# Patient Record
Sex: MALE | Race: WHITE
[De-identification: names, ages, dates, MRNs, and addresses within clinical notes are randomized per-mention and may not be internally consistent; named-entity substitution may affect disease eponyms.]

---

## 2017-05-11 NOTE — CR
EXAMINATION: Left knee

 

HISTORY: Injury

 

COMPARISON: None

 

TECHNIQUE: 4 views

 

FINDINGS/IMPRESSION: There is no acute osseous abnormality, dislocation, or fracture. Bone mineraliz
ation and joint spaces appear normal. There is prominent prepatellar soft tissue thickening.

## 2017-09-28 ENCOUNTER — HOSPITAL ENCOUNTER (OUTPATIENT)
Dept: HOSPITAL 56 - MW.ED | Age: 37
Setting detail: OBSERVATION
LOS: 2 days | Discharge: HOME | End: 2017-09-30
Attending: INTERNAL MEDICINE | Admitting: INTERNAL MEDICINE
Payer: COMMERCIAL

## 2017-09-28 DIAGNOSIS — F17.210: ICD-10-CM

## 2017-09-28 DIAGNOSIS — R07.9: ICD-10-CM

## 2017-09-28 DIAGNOSIS — F32.9: ICD-10-CM

## 2017-09-28 DIAGNOSIS — G47.30: ICD-10-CM

## 2017-09-28 DIAGNOSIS — I48.91: Primary | ICD-10-CM

## 2017-09-28 DIAGNOSIS — Z98.890: ICD-10-CM

## 2017-09-28 DIAGNOSIS — F10.99: ICD-10-CM

## 2017-09-28 DIAGNOSIS — F41.9: ICD-10-CM

## 2017-09-28 DIAGNOSIS — K21.9: ICD-10-CM

## 2017-09-28 DIAGNOSIS — Z79.899: ICD-10-CM

## 2017-09-28 DIAGNOSIS — Z79.82: ICD-10-CM

## 2017-09-28 LAB
CHLORIDE SERPL-SCNC: 112 MMOL/L (ref 98–110)
SODIUM SERPL-SCNC: 140 MMOL/L (ref 136–146)

## 2017-09-28 PROCEDURE — 93306 TTE W/DOPPLER COMPLETE: CPT

## 2017-09-28 PROCEDURE — C9113 INJ PANTOPRAZOLE SODIUM, VIA: HCPCS

## 2017-09-28 PROCEDURE — 96376 TX/PRO/DX INJ SAME DRUG ADON: CPT

## 2017-09-28 PROCEDURE — G0378 HOSPITAL OBSERVATION PER HR: HCPCS

## 2017-09-28 PROCEDURE — 84439 ASSAY OF FREE THYROXINE: CPT

## 2017-09-28 PROCEDURE — 82553 CREATINE MB FRACTION: CPT

## 2017-09-28 PROCEDURE — 80305 DRUG TEST PRSMV DIR OPT OBS: CPT

## 2017-09-28 PROCEDURE — 96375 TX/PRO/DX INJ NEW DRUG ADDON: CPT

## 2017-09-28 PROCEDURE — 71275 CT ANGIOGRAPHY CHEST: CPT

## 2017-09-28 PROCEDURE — 84484 ASSAY OF TROPONIN QUANT: CPT

## 2017-09-28 PROCEDURE — 96361 HYDRATE IV INFUSION ADD-ON: CPT

## 2017-09-28 PROCEDURE — 80053 COMPREHEN METABOLIC PANEL: CPT

## 2017-09-28 PROCEDURE — 85610 PROTHROMBIN TIME: CPT

## 2017-09-28 PROCEDURE — 93005 ELECTROCARDIOGRAM TRACING: CPT

## 2017-09-28 PROCEDURE — 84443 ASSAY THYROID STIM HORMONE: CPT

## 2017-09-28 PROCEDURE — 96365 THER/PROPH/DIAG IV INF INIT: CPT

## 2017-09-28 PROCEDURE — 81001 URINALYSIS AUTO W/SCOPE: CPT

## 2017-09-28 PROCEDURE — 99285 EMERGENCY DEPT VISIT HI MDM: CPT

## 2017-09-28 PROCEDURE — 82550 ASSAY OF CK (CPK): CPT

## 2017-09-28 PROCEDURE — 71010: CPT

## 2017-09-28 PROCEDURE — 36415 COLL VENOUS BLD VENIPUNCTURE: CPT

## 2017-09-28 PROCEDURE — 83735 ASSAY OF MAGNESIUM: CPT

## 2017-09-28 PROCEDURE — 80048 BASIC METABOLIC PNL TOTAL CA: CPT

## 2017-09-28 PROCEDURE — 85025 COMPLETE CBC W/AUTO DIFF WBC: CPT

## 2017-09-28 NOTE — EDM.PDOC
ED HPI GENERAL MEDICAL PROBLEM





- General


Chief Complaint: Chest Pain


Stated Complaint: CHEST PAIN


Time Seen by Provider: 09/28/17 20:54


Source of Information: Reports: Patient





- History of Present Illness


INITIAL COMMENTS - FREE TEXT/NARRATIVE: 





HISTORY AND PHYSICAL:


History of present illness:


[]Patient presents with right-sided chest pain radiating into the right arm he 

rates 5 out of 10 there is a reproducible component no radiation to neck or jaw 

not associated with shortness of breath or diaphoresis





However his rate is in the 150s he received Cardizem 20 mg IV followed by 25 mg 

of Cardizem which did control rate along with digoxin 0.125 and 2 L bolus





Currently he is resting easily in the 80s for her rate cautery still does have 

the pain is a reproducible component to the pain there is clear muscle spasm 

over right pack major





No fever nausea vomiting chills sweats





Denies chronic illness disease alcoholism or illicit drug use








Review of systems: 


As per history of present illness and below otherwise all systems reviewed and 

negative.


Past medical history: 


As per history of present illness and as reviewed below otherwise 

noncontributory.


Surgical history: 


As per history of present illness and as reviewed below otherwise 

noncontributory.


Social history: 


No reported history of drug or alcohol abuse.


Family history: 


As per history of present illness and as reviewed below otherwise 

noncontributory.


Physical exam:


HEENT: Atraumatic, normocephalic, pupils reactive, negative for conjunctival 

pallor or scleral icterus, mucous membranes moist, throat clear, neck supple, 

nontender, trachea midline.








Lungs: Clear to auscultation, breath sounds equal bilaterally, chest nontender.


Heart: S1S2, regular, negative for clicks, rubs, or JVD.


Abdomen: Soft, nondistended, nontender. Negative for masses or 

hepatosplenomegaly. Negative for costovertebral tenderness.


Pelvis: Stable nontender.


Genitourinary: Deferred.


Rectal: Deferred.


Extremities: Atraumatic, negative for cords or calf pain. Neurovascular 

unremarkable.


Neuro: Awake, alert, oriented. Cranial nerves II through XII unremarkable. 

Cerebellum unremarkable. Motor and sensory unremarkable throughout. Exam 

nonfocal.








Diagnostics:


[]Lab as below


EKG


Chest 1 view








Therapeutics:


[]Liter normal saline bolus 2


Cardizem 20 mg IV, Cardizem 25 mg IV


Digoxin 0.125 g IV











Impression: 


[]A. fib with RVR


Definitive disposition and diagnosis as appropriate pending reevaluation and 

review of above.


  ** Right Anterior Chest


Pain Score (Numeric/FACES): 6





- Related Data


 Allergies











Allergy/AdvReac Type Severity Reaction Status Date / Time


 


No Known Allergies Allergy   Verified 09/28/17 20:44











Home Meds: 


 Home Meds





Citalopram [Citalopram Hbr] 30 mg PO DAILY 07/04/14 [History]


buPROPion HCl [Wellbutrin SR] 300 mg PO DAILY 07/04/14 [History]











Past Medical History





- Past Health History


Medical/Surgical History: Denies Medical/Surgical History


Cardiovascular History: Reports: None


Respiratory History: Reports: None


Gastrointestinal History: Reports: None


Genitourinary History: Reports: None


Musculoskeletal History: Reports: None


Neurological History: Reports: None


Psychiatric History: Reports: Anxiety, Depression


Endocrine/Metabolic History: Reports: None


Dermatologic History: Reports: None





- Infectious Disease History


Infectious Disease History: Reports: Chicken Pox





- Past Surgical History


HEENT Surgical History: Reports: Other (See Below)


Other HEENT Surgeries/Procedures: facial reconstruction


Cardiovascular Surgical History: Reports: None


Respiratory Surgical History: Reports: None


Musculoskeletal Surgical History: Reports: None





Social & Family History





- Family History


Family Medical History: Noncontributory





- Tobacco Use


Smoking Status *Q: Current Every Day Smoker


Years of Tobacco use: 20


Packs/Tins Daily: 1.5





- Alcohol Use


Days Per Week of Alcohol Use: 2


Number of Drinks Per Day: 2


Total Drinks Per Week: 4





- Recreational Drug Use


Recreational Drug Use: No





ED ROS GENERAL





- Review of Systems


Review Of Systems: ROS reveals no pertinent complaints other than HPI.





ED EXAM, GENERAL





- Physical Exam


Exam: See Below





Course





- Vital Signs


Last Recorded V/S: 


 Last Vital Signs











Temp  36.6 C   09/28/17 20:45


 


Pulse  166 H  09/28/17 21:03


 


Resp  16   09/28/17 20:45


 


BP  107/69   09/28/17 21:03


 


Pulse Ox  98   09/28/17 20:45














- Orders/Labs/Meds


Orders: 


 Active Orders 24 hr











 Category Date Time Status


 


 EKG 12 Lead [EKG Documentation Completion] [RC] STAT Care  09/28/17 20:47 

Active


 


 Chest 1V Frontal [CR] Stat Exams  09/28/17 20:54 Taken


 


 DRUG SCREEN, URINE [URCHEM] Stat Lab  09/28/17 20:54 Uncollected


 


 UA W/MICROSCOPIC [URIN] Stat Lab  09/28/17 20:54 Uncollected


 


 Digoxin [Lanoxin] Med  09/29/17 09:00 Active





 125 mcg IVPUSH DAILY   


 


 Sodium Chloride 0.9% [Normal Saline] 1,000 ml Med  09/28/17 21:34 Active





 IV .BOLUS   


 


 Sodium Chloride 0.9% [Normal Saline] 1,000 ml Med  09/28/17 22:00 Active





 IV STAT   


 


 Sodium Chloride 0.9% [Normal Saline] 1,000 ml Med  09/28/17 22:00 Active





 IV STAT   








 Medication Orders





Digoxin (Lanoxin)  125 mcg IVPUSH DAILY TATE


Sodium Chloride (Normal Saline)  1,000 mls @ 999 mls/hr IV .BOLUS ONE


   Stop: 09/28/17 22:34


   Last Admin: 09/28/17 21:36  Dose: 999 mls/hr


Sodium Chloride (Normal Saline)  1,000 mls @ 125 mls/hr IV STAT TATE


Sodium Chloride (Normal Saline)  1,000 mls @ 125 mls/hr IV STAT TATE








Labs: 


 Laboratory Tests











  09/28/17 09/28/17 09/28/17 Range/Units





  20:59 20:59 20:59 


 


WBC  11.24 H    (4.0-11.0)  K/uL


 


RBC  4.96    (4.50-5.90)  M/uL


 


Hgb  15.7    (13.0-17.0)  g/dL


 


Hct  45.9    (38.0-50.0)  %


 


MCV  92.5    (80.0-98.0)  fL


 


MCH  31.7    (27.0-32.0)  pg


 


MCHC  34.2    (31.0-37.0)  g/dL


 


RDW Std Deviation  45.1    (28.0-62.0)  fl


 


RDW Coeff of Jose  13    (11.0-15.0)  %


 


Plt Count  187    (150-400)  K/uL


 


MPV  9.90    (7.40-12.00)  fL


 


Neut % (Auto)  69.0    (48.0-80.0)  %


 


Lymph % (Auto)  20.7    (16.0-40.0)  %


 


Mono % (Auto)  8.1    (0.0-15.0)  %


 


Eos % (Auto)  1.8    (0.0-7.0)  %


 


Baso % (Auto)  0.4    (0.0-1.5)  %


 


Neut # (Auto)  7.8 H    (1.4-5.7)  K/uL


 


Lymph # (Auto)  2.3    (0.6-2.4)  K/uL


 


Mono # (Auto)  0.9 H    (0.0-0.8)  K/uL


 


Eos # (Auto)  0.2    (0.0-0.7)  K/uL


 


Baso # (Auto)  0.0    (0.0-0.1)  K/uL


 


Nucleated RBC %  0.0    /100WBC


 


Nucleated RBCs #  0    K/uL


 


Sodium   140   (136-146)  mmol/L


 


Potassium   4.5   (3.5-5.1)  mmol/L


 


Chloride   112 H   ()  mmol/L


 


Carbon Dioxide   18 L   (21-31)  mmol/L


 


BUN   18   (6.0-23.0)  mg/dL


 


Creatinine   1.1   (0.6-1.5)  mg/dL


 


Est Cr Clr Drug Dosing   106.90   mL/min


 


Estimated GFR (MDRD)   > 60.0   ml/min


 


Glucose   121 H   ()  mg/dL


 


Calcium   9.5   (8.8-10.8)  mg/dL


 


Total Bilirubin   0.4   (0.1-1.5)  mg/dL


 


AST   25   (5-40)  IU/L


 


ALT   40   (8-54)  IU/L


 


Alkaline Phosphatase   144   ()  


 


Creatine Kinase   140   (9-236)  IU/L


 


CK-MB (CK-2)   1.7   (0-6.6)  ng/ml


 


Troponin I    < 0.10  (0.0-0.29)  NG/ML


 


Total Protein   6.9   (6.0-8.0)  g/dL


 


Albumin   4.1   (3.5-5.0)  g/dL


 


Globulin   2.8   (2.0-3.5)  g/dL


 


Albumin/Globulin Ratio   1.5   (1.3-2.8)  











Meds: 


Medications











Generic Name Dose Route Start Last Admin





  Trade Name Freq  PRN Reason Stop Dose Admin


 


Digoxin  125 mcg  09/29/17 09:00  





  Lanoxin  IVPUSH   





  DAILY TATE   


 


Sodium Chloride  1,000 mls @ 999 mls/hr  09/28/17 21:34  09/28/17 21:36





  Normal Saline  IV  09/28/17 22:34  999 mls/hr





  .BOLUS ONE   Administration


 


Sodium Chloride  1,000 mls @ 125 mls/hr  09/28/17 22:00  





  Normal Saline  IV   





  STAT TATE   


 


Sodium Chloride  1,000 mls @ 125 mls/hr  09/28/17 22:00  





  Normal Saline  IV   





  STAT TATE   














Discontinued Medications














Generic Name Dose Route Start Last Admin





  Trade Name Freq  PRN Reason Stop Dose Admin


 


Diltiazem HCl  20 mg  09/28/17 20:54  09/28/17 21:16





  Diltiazem  IVPUSH  09/28/17 20:55  20 mg





  ONETIME ONE   Administration


 


Diltiazem HCl  25 mg  09/28/17 21:24  09/28/17 21:30





  Diltiazem  IVPUSH  09/28/17 21:25  25 mg





  ONETIME ONE   Administration


 


Sodium Chloride  1,000 mls @ 999 mls/hr  09/28/17 20:54  09/28/17 21:02





  Normal Saline  IV  09/28/17 21:54  999 mls/hr





  STAT ONE   Administration


 


Metoprolol Tartrate  5 mg  09/28/17 20:57  09/28/17 21:03





  Lopressor  IVPUSH  09/28/17 20:58  5 mg





  ONETIME ONE   Administration














Departure





- Departure


Time of Disposition: 22:24


Disposition: Refer to Observation


Condition: Fair


Clinical Impression: 


 Atrial fibrillation with RVR








- Discharge Information


Forms:  ED Department Discharge





- My Orders


Last 24 Hours: 


My Active Orders





09/28/17 20:47


EKG 12 Lead [EKG Documentation Completion] [RC] STAT 





09/28/17 20:54


Chest 1V Frontal [CR] Stat 


DRUG SCREEN, URINE [URCHEM] Stat 


UA W/MICROSCOPIC [URIN] Stat 





09/28/17 21:34


Sodium Chloride 0.9% [Normal Saline] 1,000 ml IV .BOLUS 





09/28/17 22:00


Sodium Chloride 0.9% [Normal Saline] 1,000 ml IV STAT 


Sodium Chloride 0.9% [Normal Saline] 1,000 ml IV STAT 





09/29/17 09:00


Digoxin [Lanoxin]   125 mcg IVPUSH DAILY 














- Assessment/Plan


Last 24 Hours: 


My Active Orders





09/28/17 20:47


EKG 12 Lead [EKG Documentation Completion] [RC] STAT 





09/28/17 20:54


Chest 1V Frontal [CR] Stat 


DRUG SCREEN, URINE [URCHEM] Stat 


UA W/MICROSCOPIC [URIN] Stat 





09/28/17 21:34


Sodium Chloride 0.9% [Normal Saline] 1,000 ml IV .BOLUS 





09/28/17 22:00


Sodium Chloride 0.9% [Normal Saline] 1,000 ml IV STAT 


Sodium Chloride 0.9% [Normal Saline] 1,000 ml IV STAT 





09/29/17 09:00


Digoxin [Lanoxin]   125 mcg IVPUSH DAILY

## 2017-09-29 RX ADMIN — SODIUM CHLORIDE SCH MLS/HR: 9 INJECTION, SOLUTION INTRAMUSCULAR; INTRAVENOUS; SUBCUTANEOUS at 10:47

## 2017-09-29 NOTE — CT
EXAM DATE: 17



PATIENT'S AGE: 36





Patient: KELLEE MUNOZ



Facility: [place], [place]

Patient ID: [id number]

Site Patient ID: [id number].

Site Accession #: [id number]

: 1980

Study: CT Chest Angio [id number]/29/2017 8:48:56 AM

Ordering Physician: Evelyn Kirk



Final Report: 

INDICATION:

Chest pain and shortness of breath.



TECHNIQUE:

Contiguous axial images were acquired through the chest after the intravenous 
administration of 50 mL of Isovue, with image acquisition timed for pulmonary 
arterial enhancement. Sagittal and coronal reconstructions.



COMPARISON:

No prior chest CT.



FINDINGS:

There is adequate contrast opacification of the pulmonary arteries with no 
appreciable filling defects to suggest an acute pulmonary embolus.

Heart is mildly enlarged for patient`s age. No pericardial effusion. Normal 
caliber thoracic aorta. A number of small lymph nodes are seen scattered 
throughout the chest. A few of these are mildly enlarged. For example, a right 
paratracheal lymph node on axial image 114 has a short axis diameter of 1.1 cm. 
An AP window lymph node on axial image 201 has a short axis diameter of 1 cm. 
These are nonspecific.

In the lungs, there is smooth septal thickening with peribronchial thickening 
suggestive of interstitial edema. No significant pleural fluid.

No acute abnormality in the visualized upper abdomen.

No acute bony abnormality.



IMPRESSION:

1. No CT evidence of acute pulmonary embolus.

2. The heart is mildly enlarged, with findings suggestive of interstitial 
edema. No pleural effusion. 

3. Nonspecific mildly enlarged thoracic lymph nodes. These could be reactive in 
nature. The possibility of a lymphoproliferative disorder is not excluded. 
Recommend clinical correlation with a 6 month followup CT.



Dictated by Leroy Burnham MD @ 2017 9:18:18 AM





Dictated by: Leroy Burnham MD @ 2017 09:18:29

(Electronic Signature)



Report Signed by Proxy.
[facility]

## 2017-09-29 NOTE — PCM.SN
- Free Text/Narrative


Note: 





Patients HR was controlled and chest pain resolved. He was ready for discharge. 

Then chest pain returned and HR increased up to 100-120. He was given Cardizem 

25 mg IV. Lopressor PO was increased to 100 mg TID. His HR improved but then 

increased again with ambulation. Patient will remain for additional day of 

observation with telemetry.

## 2017-09-29 NOTE — CR
EXAM DATE: 17



PATIENT'S AGE: 36





Patient: KELLEE MUNOZ



Facility: [place], [place]

Patient ID: [id number]

Site Patient ID: [id number].

Site Accession #: [id number]

: 1980

Study: XRay Chest [id number]/28/2017 9:27:04 PM

Ordering Physician: Arsen Boo



Final Report: 

INDICATION: CHEST PAIN/SHORTNESS OF BREATH



TECHNIQUE:

Chest 1 view. 



COMPARISON:

7/10/17 



FINDINGS:

Cardiovascular and mediastinum: Heart size and vasculature are normal in 
caliber and appearance. Mediastinum is within normal limits. 



Lungs and pleural space: Lungs are clear. No sign of infiltrate or mass. No 
sign of pleural effusion. No pneumothorax. 



Bones and soft tissues: No significant findings. 



IMPRESSION:

Unremarkable chest.





Dictated by: Stanley Arenas MD @ 2017 21:41:00

(Electronic Signature)



Report Signed by Proxy.
ROSALINDA

## 2017-09-29 NOTE — PCM.HP
H&P History of Present Illness





- General


Date of Service: 09/29/17


Admit Problem/Dx: 


 Admission Diagnosis/Problem





Admission Diagnosis/Problem      Afib, Atrial fibrillation











- History of Present Illness


Initial Comments - Free Text/Narative: 





37 yo male who presents with chest pain.  He reports one day history of chest 

pain which he reported started as a sore throat then he took ibuprofen and 

later the pain transfered to his right upper chest and now it is substernal.  

Deep breaths make it worse and that pain takes his breath away.  He reports 

shortness of breath.  In the ED he was noted to be in atrial fibrillation with 

RVR.  He was given IV Cardizem and digoxin with improvement in his heart rate.  

Initial EKG and troponin did not show any signs of ischemia.


  ** Right Anterior Chest


Pain Score (Numeric/FACES): 7





- Related Data


Allergies/Adverse Reactions: 


 Allergies











Allergy/AdvReac Type Severity Reaction Status Date / Time


 


No Known Allergies Allergy   Verified 09/28/17 20:44











Home Medications: 


 Home Meds





Citalopram [Citalopram HBr] 30 mg PO DAILY 07/04/14 [History]


buPROPion HCl [Wellbutrin SR] 300 mg PO DAILY 07/04/14 [History]


Testosterone Cypionate  09/28/17 [History]


Aspirin 325 mg PO DAILY #30 tablet 09/29/17 [Rx]


Pantoprazole Sodium [Protonix] 40 mg PO DAILY #30 tablet. 09/29/17 [Rx]


Diltiazem [Cardizem] 30 mg PO Q6HR #60 tablet 09/30/17 [Rx]


Metoprolol Tartrate [Lopressor] 100 mg PO Q12HR #30 tablet 09/30/17 [Rx]











Past Medical History





- Past Health History


Medical/Surgical History: Denies Medical/Surgical History


Cardiovascular History: Reports: None


Respiratory History: Reports: Sleep Apnea


Other Respiratory History: Uses CPAP at home


Gastrointestinal History: Reports: None


Genitourinary History: Reports: None


Musculoskeletal History: Reports: Fracture


Other Musculoskeletal History: Fractured collar bone in "seventh grade"


Neurological History: Reports: None


Psychiatric History: Reports: Anxiety, Depression


Endocrine/Metabolic History: Reports: None


Hematologic History: Reports: None


Immunologic History: Reports: None


Oncologic (Cancer) History: Reports: None


Dermatologic History: Reports: None





- Infectious Disease History


Infectious Disease History: Reports: Chicken Pox





- Past Surgical History


HEENT Surgical History: Reports: Other (See Below)


Other HEENT Surgeries/Procedures: facial reconstruction


Cardiovascular Surgical History: Reports: None


Respiratory Surgical History: Reports: None


Musculoskeletal Surgical History: Reports: None





Social & Family History





- Family History


Family Medical History: Noncontributory


Cardiac: Reports: MI


Other Cardiac Family History: Father had heart attack X 5 years ago





- Tobacco Use


Smoking Status *Q: Current Every Day Smoker


Years of Tobacco use: 20


Packs/Tins Daily: 1.5


Second Hand Smoke Exposure: No





- Caffeine Use


Caffeine Use: Reports: Soda





- Alcohol Use


Days Per Week of Alcohol Use: 2


Number of Drinks Per Day: 2


Total Drinks Per Week: 4


Date of Last Drink: 09/27/17


Time of Last Drink: 20:30





- Recreational Drug Use


Recreational Drug Use: No





H&P Review of Systems





- Review of Systems:


Review Of Systems: ROS reveals no pertinent complaints other than HPI.





Exam





- Exam


Exam: See Below





- Vital Signs


Vital Signs: 


 Last Vital Signs











Temp  36.5 C   09/29/17 05:02


 


Pulse  84   09/29/17 05:02


 


Resp  20   09/29/17 05:02


 


BP  139/76   09/29/17 05:02


 


Pulse Ox  96   09/29/17 05:02











Weight: 129.274 kg





- Exam


General: Alert, Oriented, 4


HEENT: Posterior Pharynx Clear


Cardiovascular: Regular Rate, Irregular Rhythm


GI/Abdominal Exam: Normal Bowel Sounds, No Distention


Extremities: No Pedal Edema


Skin: Warm, Dry, Intact





- Patient Data


Lab Results Last 24 hrs: 


 Laboratory Results - last 24 hr











  09/28/17 09/28/17 09/29/17 Range/Units





  22:50 22:50 02:52 


 


Troponin I    < 0.10  (0.0-0.29)  NG/ML


 


Urine Color   YELLOW   


 


Urine Appearance   CLEAR   


 


Urine pH   5.5   (5.0-8.0)  


 


Ur Specific Gravity   >= 1.030   (1.001-1.035)  


 


Urine Protein   NEGATIVE   (NEGATIVE)  mg/dL


 


Urine Glucose (UA)   NEGATIVE   (NEGATIVE)  mg/dL


 


Urine Ketones   NEGATIVE   (NEGATIVE)  mg/dL


 


Urine Occult Blood   NEGATIVE   (NEGATIVE)  


 


Urine Nitrite   NEGATIVE   (NEGATIVE)  


 


Urine Bilirubin   NEGATIVE   (NEGATIVE)  


 


Urine Urobilinogen   0.2   (<2.0)  EU/dL


 


Ur Leukocyte Esterase   NEGATIVE   (NEGATIVE)  


 


Urine RBC   0-1   (0-2/HPF)  


 


Urine WBC   0-1   (0-5/HPF)  


 


Ur Epithelial Cells   FEW   (NONE-FEW)  


 


Amorphous Sediment   FEW   (NEGATIVE)  


 


Urine Bacteria   RARE   (NEGATIVE)  


 


Urine Opiates Screen  NEGATIVE    (NEGATIVE)  


 


Ur Oxycodone Screen  NEGATIVE    (NEGATIVE)  


 


Urine Methadone Screen  NEGATIVE    (NEGATIVE)  


 


Ur Barbiturates Screen  NEGATIVE    (NEGATIVE)  


 


Ur Phencyclidine Scrn  NEGATIVE    (NEGATIVE)  


 


Ur Amphetamine Screen  NEGATIVE    (NEGATIVE)  


 


U Methamphetamines Scrn  NEGATIVE    (NEGATIVE)  


 


U Benzodiazepines Scrn  NEGATIVE    (NEGATIVE)  


 


U Cocaine Metab Screen  NEGATIVE    (NEGATIVE)  


 


U Marijuana (THC) Screen  NEGATIVE    (NEGATIVE)  











Result Diagrams: 


 09/30/17 05:07





 09/30/17 05:07





*Q Meaningful Use (ADM)





- VTE *Q


VTE Criteria *Q: 








- Stroke *Q


Stroke Criteria *Q: 








- AMI *Q


AMI Criteria *Q: 





Problem List Initiated/Reviewed/Updated: Yes


Orders Last 24hrs: 


 Active Orders 24 hr











 Category Date Time Status


 


 EKG 12 Lead [EKG Documentation Completion] [RC] ROUTINE Care  09/29/17 04:42 

Active


 


 Telemetry Monitoring [Cardiac Monitoring] [RC] Q8H Care  09/28/17 23:49 Active


 


 Heart Healthy Diet [DIET] Diet  09/29/17 Breakfast Active


 


 Chest PE [Ang Chest] [CT] Stat Exams  09/29/17 06:18 Ordered


 


 TROPONIN I [CHEM] Q6H Lab  09/29/17 09:00 Ordered


 


 Diltiazem Med  09/28/17 23:52 Active





 10 mg IVPUSH Q3H PRN   


 


 GI Cocktail 20 ML PO x 1 Med  09/29/17 06:20 Ordered





 Alum Hydrox/Mag Hydrox/Simeth [Mag-Al Plus] 15 ml   





 Lidocaine 2% [Xylocaine 2% Viscous] 5 ml   





 PO ONETIME   


 


 Metoprolol Tartrate [Lopressor] Med  09/28/17 23:45 Active





 50 mg PO Q12HR   


 


 Morphine Med  09/28/17 23:53 Active





 2 mg IVPUSH Q3H PRN   


 


 Ondansetron [Zofran] Med  09/28/17 23:54 Active





 4 mg IVPUSH Q3H PRN   


 


 Sodium Chloride 0.9% [Saline Flush] Med  09/28/17 23:51 Active





 10 ml FLUSH ASDIRECTED PRN   


 


 Sodium Chloride 0.9% [Saline Flush] Med  09/28/17 23:51 Active





 2.5 ml FLUSH ASDIRECTED PRN   


 


 Convert IV to Saline Lock [OM.PC] Routine Oth  09/28/17 23:51 Ordered








 Medication Orders





Al Hydroxide/Mg Hydroxide 15 (ml/ Lidocaine HCl 5 ml)  0 ml PO ONETIME ONE


   Stop: 09/29/17 06:21


Digoxin (Lanoxin)  125 mcg IVPUSH DAILY TATE


Diltiazem HCl (Diltiazem)  10 mg IVPUSH Q3H PRN


   PRN Reason: Other


   Last Admin: 09/29/17 03:46  Dose: 10 mg


Metoprolol Tartrate (Lopressor)  50 mg PO Q12HR TATE


   Last Admin: 09/29/17 00:11  Dose: 50 mg


Morphine Sulfate (Morphine)  2 mg IVPUSH Q3H PRN


   PRN Reason: Pain


   Last Admin: 09/29/17 03:52  Dose: 2 mg


Ondansetron HCl (Zofran)  4 mg IVPUSH Q3H PRN


   PRN Reason: Nausea/Vomiting


Sodium Chloride (Saline Flush)  10 ml FLUSH ASDIRECTED PRN


   PRN Reason: Keep Vein Open


Sodium Chloride (Saline Flush)  2.5 ml FLUSH ASDIRECTED PRN


   PRN Reason: Keep Vein Open








Assessment/Plan Comment:: 





37 yo male admitted with chest pain


Chest pain: will trend cardiac enzymes, morphine prn,  Will get CT scan of 

chest for further evaluation


Atrial fibrillation: has received multiple doses of IV diltiazem, have started 

metoprolol, will continue to monitor heart rate.








Update:


CT angio reports no PE, mildly enlarged heart, and nonspecific mildly enlarged 

thoracic lymph nodes which the radiologist recommended followup CT in 6 months


Patient remains chest pain free and rate controlled on oral metroprolol.





Plan: will discharge home on metoprolol 100mg BID and Aspirin daily.  He is to 

follow up with Dr. Pineda as outpatient.  Report from Echocardiogram is 

pending at time of discharge.  Will also send home on protonix as chest pain 

could be GI in nature.  PAtient was informed of CT chest scan findings and 

recommendations for follow up CT scan in 6 months.

## 2017-09-30 VITALS — DIASTOLIC BLOOD PRESSURE: 83 MMHG | SYSTOLIC BLOOD PRESSURE: 128 MMHG

## 2017-09-30 LAB
CHLORIDE SERPL-SCNC: 106 MMOL/L (ref 98–110)
SODIUM SERPL-SCNC: 137 MMOL/L (ref 136–146)

## 2017-09-30 RX ADMIN — SODIUM CHLORIDE SCH MLS/HR: 9 INJECTION, SOLUTION INTRAMUSCULAR; INTRAVENOUS; SUBCUTANEOUS at 08:20

## 2017-09-30 NOTE — PCM.DCSUM1
<Noel,Alfred - Last Filed: 09/30/17 13:26>





**Discharge Summary





- Hospital Course


Free Text/Narrative:: 





35 yo male admitted 9/28 for AF with RVR. He presented to ED with heart 

palpitations and substernal chest pain radiating to neck. Initial EKG and 

troponin did not show any signs of ischemia. He was given IV Cardizem and 

digoxin with improvement in his heart rate. He was transferred to the floor for 

observation and telemetry. His heart rate began to increase again, ekg was done 

which revealed slight st elevation on only lead 2. CTA was done which was 

negative. Monitoring was continued. He was treated with IV cardizem and started 

on Metoprolol 75 mg PO BID. His HR decreased again and chest pain began to 

improve. Echo was obtained. Patient was planned for discharge but chest pain 

returned and hr began to increase. He was given Cardizem 25 mg IV but 

improvement was minimal. Decision made to keep patient another night for 

monitoring and increasing Metoprolol to 100 mg BID. His HR was still 

fluctuating bw 100-140. Cardizem 30 mg [id number] was started which did result in 

the improvement of HR to . He was discharged home on 9/30. F/U 

appointment was arranged for him with Dr. Ch from Cardiology on 

friday 10/6/17. Appointment was also made with PCP. He was discharged home on 

Metoprolol 100 mg PO BID and Cardizem 30 mg [id number]. Based on [id number] score he 

was sent home on Aspirin 325 mg daily. He was also educated on importance of 

alcohol cessation. 





Discharge Diagnosis:


1. AF With RVR, rate controlled 


2. Chest Pain


3. Alcohol Use Disorder


4. Esophageal Reflux 





Discharge Plan:


1. Metoprolol 100 mg PO BID


3. Aspirin 325 mg QD


4. Alcohol Cessation 


5. f/u with Dr. Ch, Cardiology 


6. f/u with PCP


7. Protonix 40 mg PO Daily, 30 tabs 





- Discharge Data


Discharge Date: 09/30/17


Discharge Disposition: Home, Self-Care 01


Condition: Good





- Patient Instructions


Diet: Heart Healthy Diet, No Alcoholic Beverages


Activity: As Tolerated


Showering/Bathing: May Shower


Notify Provider of: Fever, Increased Pain, Swelling and Redness, Drainage, 

Nausea and/or Vomiting





- Discharge Plan


Prescriptions/Med Rec: 


Diltiazem [Cardizem] 30 mg PO Q6HR #60 tablet


Metoprolol Tartrate [Lopressor] 100 mg PO Q12HR #30 tablet


Aspirin 325 mg PO DAILY #30 tablet


Pantoprazole Sodium [Protonix] 40 mg PO DAILY #30 tablet.


Home Medications: 


 Home Meds





Citalopram [Citalopram HBr] 30 mg PO DAILY 07/04/14 [History]


buPROPion HCl [Wellbutrin SR] 300 mg PO DAILY 07/04/14 [History]


Testosterone Cypionate  09/28/17 [History]


Aspirin 325 mg PO DAILY #30 tablet 09/29/17 [Rx]


Pantoprazole Sodium [Protonix] 40 mg PO DAILY #30 tablet. 09/29/17 [Rx]


Diltiazem [Cardizem] 30 mg PO Q6HR #60 tablet 09/30/17 [Rx]


Metoprolol Tartrate [Lopressor] 100 mg PO Q12HR #30 tablet 09/30/17 [Rx]








Patient Handouts:  Aspirin, ASA chewable tablets, Metoprolol tablets, 

Pantoprazole tablets, Diltiazem tablets, Atrial Fibrillation, Easy-to-Read


Referrals: 


Mckayla Ch MD [Physician] - 10/06/17 1:00 pm


Stanley Alvarez MD [Physician] - 10/11/17 11:00 am





- Patient Data


Vitals - Most Recent: 


 Last Vital Signs











Temp  36.2 C   09/30/17 00:00


 


Pulse  70   09/30/17 00:00


 


Resp  16   09/30/17 00:00


 


BP  137/70   09/30/17 00:00


 


Pulse Ox  95   09/30/17 00:00











Weight - Most Recent: 129.274 kg


I&O - Last 24 hours: 


 Intake & Output











 09/29/17 09/29/17 09/30/17





 14:59 22:59 06:59


 


Intake Total 60 600 


 


Output Total  700 


 


Balance 60 -100 











Lab Results - Last 24 hrs: 


 Laboratory Results - last 24 hr











  09/29/17 09/29/17 09/29/17 Range/Units





  02:52 07:43 09:04 


 


INR   1.03   (0.86-1.11)  


 


Troponin I    < 0.10  (0.0-0.29)  NG/ML


 


Free T4  0.80    (0.7-1.48)  ng/dL


 


TSH 3rd Generation  1.59    (0.47-5.0)  uIU/mL











Med Orders - Current: 


 Current Medications





Digoxin (Lanoxin)  125 mcg IVPUSH DAILY [facility]


   Last Admin: 09/29/17 09:02 Dose:  125 mcg


Pantoprazole Sodium 40 mg/ (Sodium Chloride)  10 mls @ 300 mls/hr IVPUSH DAILY 

[facility]


   Last Admin: 09/29/17 10:47 Dose:  300 mls/hr


Metoprolol Tartrate (Lopressor)  100 mg PO Q12HR [facility]


   Last Admin: 09/29/17 20:29 Dose:  100 mg


Morphine Sulfate (Morphine)  2 mg IVPUSH Q3H PRN


   PRN Reason: Pain


   Last Admin: 09/29/17 03:52 Dose:  2 mg


Ondansetron HCl (Zofran)  4 mg IVPUSH Q3H PRN


   PRN Reason: Nausea/Vomiting


Sodium Chloride (Saline Flush)  10 ml FLUSH ASDIRECTED PRN


   PRN Reason: Keep Vein Open


Sodium Chloride (Saline Flush)  2.5 ml FLUSH ASDIRECTED PRN


   PRN Reason: Keep Vein Open





Discontinued Medications





Al Hydroxide/Mg Hydroxide 15 (ml/ Lidocaine HCl 5 ml)  0 ml PO ONETIME ONE


   Stop: 09/29/17 06:21


   Last Admin: 09/29/17 07:36 Dose:  1 each


Diltiazem HCl (Diltiazem)  20 mg IVPUSH ONETIME ONE


   Stop: 09/28/17 20:55


   Last Admin: 09/28/17 21:16 Dose:  20 mg


Diltiazem HCl (Diltiazem)  25 mg IVPUSH ONETIME ONE


   Stop: 09/28/17 21:25


   Last Admin: 09/28/17 21:30 Dose:  25 mg


Diltiazem HCl (Diltiazem)  10 mg IVPUSH Q3H PRN


   PRN Reason: Other


   Last Admin: 09/29/17 03:46 Dose:  10 mg


Diltiazem HCl (Diltiazem)  10 mg IVPUSH ONETIME ONE


   Stop: 09/28/17 06:33


   Last Admin: 09/29/17 08:03 Dose:  Not Given


Diltiazem HCl (Diltiazem)  10 mg IVPUSH ONETIME ONE


   Stop: 09/29/17 06:33


   Last Admin: 09/29/17 06:44 Dose:  10 mg


Diltiazem HCl (Diltiazem)  25 mg IVPUSH ONETIME ONE


   Stop: 09/29/17 16:19


   Last Admin: 09/29/17 16:34 Dose:  25 mg


Sodium Chloride (Normal Saline)  1,000 mls @ 999 mls/hr IV STAT ONE


   Stop: 09/28/17 21:54


   Last Admin: 09/28/17 21:02 Dose:  999 mls/hr


Sodium Chloride (Normal Saline)  1,000 mls @ 999 mls/hr IV .BOLUS ONE


   Stop: 09/28/17 22:34


   Last Admin: 09/28/17 21:36 Dose:  999 mls/hr


Sodium Chloride (Normal Saline)  1,000 mls @ 125 mls/hr IV STAT TATE


   Last Admin: 09/28/17 22:44 Dose:  125 mls/hr


Sodium Chloride (Normal Saline)  1,000 mls @ 125 mls/hr IV STAT TATE


Magnesium Sulfate 2 gm/ Premix  50 mls @ 50 mls/hr IV ONETIME ONE


   Stop: 09/29/17 07:25


   Last Admin: 09/29/17 06:44 Dose:  50 mls/hr


Iopamidol (Isovue Multipack-370 (76%))  50 ml IVPUSH ONETIME STA


   Stop: 09/29/17 08:26


   Last Admin: 09/29/17 08:26 Dose:  50 ml


Ketorolac Tromethamine (Toradol)  30 mg IVPUSH ONETIME ONE


   Stop: 09/28/17 22:32


   Last Admin: 09/28/17 22:48 Dose:  30 mg


Metoprolol Tartrate (Lopressor)  5 mg IVPUSH ONETIME ONE


   Stop: 09/28/17 20:58


   Last Admin: 09/28/17 21:03 Dose:  5 mg


Metoprolol Tartrate (Lopressor)  50 mg PO Q12HR TATE


   Last Admin: 09/29/17 09:09 Dose:  50 mg


Metoprolol Tartrate (Lopressor)  75 mg PO Q12HR TATE











*Q Meaningful Use (DIS)





- VTE *Q


VTE Criteria *Q: 








- Stroke *Q


Stroke Criteria *Q: 








- AMI *Q


AMI Criteria *Q: 








<Reagan Rivas - Last Filed: 10/02/17 19:51>





- Patient Data


Vitals - Most Recent: 


 Last Vital Signs











Temp  36.9 C   09/30/17 12:00


 


Pulse  86   09/30/17 12:00


 


Resp  16   09/30/17 12:00


 


BP  128/83   09/30/17 12:00


 


Pulse Ox  95   09/30/17 12:00











Med Orders - Current: 


 Current Medications








Discontinued Medications





Al Hydroxide/Mg Hydroxide 15 (ml/ Lidocaine HCl 5 ml)  0 ml PO ONETIME ONE


   Stop: 09/29/17 06:21


   Last Admin: 09/29/17 07:36 Dose:  1 each


Digoxin (Lanoxin)  125 mcg IVPUSH DAILY [facility]


   Last Admin: 09/30/17 08:17 Dose:  125 mcg


Diltiazem HCl (Diltiazem)  20 mg IVPUSH ONETIME ONE


   Stop: 09/28/17 20:55


   Last Admin: 09/28/17 21:16 Dose:  20 mg


Diltiazem HCl (Diltiazem)  25 mg IVPUSH ONETIME ONE


   Stop: 09/28/17 21:25


   Last Admin: 09/28/17 21:30 Dose:  25 mg


Diltiazem HCl (Diltiazem)  10 mg IVPUSH Q3H PRN


   PRN Reason: Other


   Last Admin: 09/29/17 03:46 Dose:  10 mg


Diltiazem HCl (Diltiazem)  10 mg IVPUSH ONETIME ONE


   Stop: 09/28/17 06:33


   Last Admin: 09/29/17 08:03 Dose:  Not Given


Diltiazem HCl (Diltiazem)  10 mg IVPUSH ONETIME ONE


   Stop: 09/29/17 06:33


   Last Admin: 09/29/17 06:44 Dose:  10 mg


Diltiazem HCl (Diltiazem)  25 mg IVPUSH ONETIME ONE


   Stop: 09/29/17 16:19


   Last Admin: 09/29/17 16:34 Dose:  25 mg


Diltiazem HCl (Cardizem)  30 mg PO Q6HR [facility]


   Last Admin: 09/30/17 12:39 Dose:  30 mg


Sodium Chloride (Normal Saline)  1,000 mls @ 999 mls/hr IV STAT ONE


   Stop: 09/28/17 21:54


   Last Admin: 09/28/17 21:02 Dose:  999 mls/hr


Sodium Chloride (Normal Saline)  1,000 mls @ 999 mls/hr IV .BOLUS ONE


   Stop: 09/28/17 22:34


   Last Admin: 09/28/17 21:36 Dose:  999 mls/hr


Sodium Chloride (Normal Saline)  1,000 mls @ 125 mls/hr IV STAT TATE


   Last Admin: 09/28/17 22:44 Dose:  125 mls/hr


Sodium Chloride (Normal Saline)  1,000 mls @ 125 mls/hr IV STAT TATE


Magnesium Sulfate 2 gm/ Premix  50 mls @ 50 mls/hr IV ONETIME ONE


   Stop: 09/29/17 07:25


   Last Admin: 09/29/17 06:44 Dose:  50 mls/hr


Pantoprazole Sodium 40 mg/ (Sodium Chloride)  10 mls @ 300 mls/hr IVPUSH DAILY 

TATE


   Last Admin: 09/30/17 08:20 Dose:  300 mls/hr


Magnesium Sulfate 2 gm/ Premix  50 mls @ 50 mls/hr IV ONETIME ONE


   Stop: 09/30/17 13:18


   Last Admin: 09/30/17 12:38 Dose:  50 mls/hr


Iopamidol (Isovue Multipack-370 (76%))  50 ml IVPUSH ONETIME STA


   Stop: 09/29/17 08:26


   Last Admin: 09/29/17 08:26 Dose:  50 ml


Ketorolac Tromethamine (Toradol)  30 mg IVPUSH ONETIME ONE


   Stop: 09/28/17 22:32


   Last Admin: 09/28/17 22:48 Dose:  30 mg


Metoprolol Tartrate (Lopressor)  5 mg IVPUSH ONETIME ONE


   Stop: 09/28/17 20:58


   Last Admin: 09/28/17 21:03 Dose:  5 mg


Metoprolol Tartrate (Lopressor)  50 mg PO Q12HR [facility]


   Last Admin: 09/29/17 09:09 Dose:  50 mg


Metoprolol Tartrate (Lopressor)  75 mg PO Q12HR TATE


Metoprolol Tartrate (Lopressor)  100 mg PO Q12HR [facility]


   Last Admin: 09/30/17 08:15 Dose:  100 mg


Morphine Sulfate (Morphine)  2 mg IVPUSH Q3H PRN


   PRN Reason: Pain


   Last Admin: 09/29/17 03:52 Dose:  2 mg


Ondansetron HCl (Zofran)  4 mg IVPUSH Q3H PRN


   PRN Reason: Nausea/Vomiting


Sodium Chloride (Saline Flush)  10 ml FLUSH ASDIRECTED PRN


   PRN Reason: Keep Vein Open


Sodium Chloride (Saline Flush)  2.5 ml FLUSH ASDIRECTED PRN


   PRN Reason: Keep Vein Open











*Q Meaningful Use (DIS)





- VTE *Q


VTE Criteria *Q: 








- Stroke *Q


Stroke Criteria *Q: 








- AMI *Q


AMI Criteria *Q: 








- Free Text/Narrative


Note: 


I have examined the patient.  I have discussed findings and treatment plan with 

resident.  I agree with the assessment and plan outlined in the following 

resident's note.

## 2017-10-03 NOTE — ECHO
EXAM DATE: 09/28/17



PATIENT'S AGE: 36





The echocardiogram report can be seen in this patient's EMR (Electronic Medical 
Record) in the Reports section. The report has also been scanned into PACs. 



ROSALINDA

## 2018-08-17 ENCOUNTER — HOSPITAL ENCOUNTER (EMERGENCY)
Dept: HOSPITAL 56 - MW.ED | Age: 38
Discharge: HOME | End: 2018-08-17
Payer: COMMERCIAL

## 2018-08-17 VITALS — SYSTOLIC BLOOD PRESSURE: 147 MMHG | DIASTOLIC BLOOD PRESSURE: 91 MMHG

## 2018-08-17 DIAGNOSIS — S61.211A: Primary | ICD-10-CM
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
## 2018-08-17 DIAGNOSIS — Z79.899: ICD-10-CM
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
## 2018-08-17 NOTE — EDM.PDOC
ED HPI GENERAL MEDICAL PROBLEM





- General


Chief Complaint: Laceration


Stated Complaint: INJURY FINGER ON LT HAND


Time Seen by Provider: 08/17/18 15:29


Source of Information: Reports: Patient





- History of Present Illness


INITIAL COMMENTS - FREE TEXT/NARRATIVE: 





HISTORY AND PHYSICAL:


History of present illness:


[Patient presents by private vehicle





He cut the tip of his left index finger with a dominik/ approximately 0.5 

cm linear laceration simple all in the distal tip of his finger it had bled 

well patient presented as the wound bleeding initially was not stopping, 

however he did place a bandaide on the tip of his finger and bleeding and 

resolved by arrival entire limb is neurovascularly intact tendon function 

intact and extensor





No fever nausea vomiting chills sweats





Tetanus status is up to date and is on file





Review of systems: 


As per history of present illness and below otherwise all systems reviewed and 

negative.


Past medical history: 


As per history of present illness and as reviewed below otherwise 

noncontributory.


Surgical history: 


As per history of present illness and as reviewed below otherwise 

noncontributory.


Social history: 


No reported history of drug or alcohol abuse.


Family history: 


As per history of present illness and as reviewed below otherwise 

noncontributory.


Physical exam:


HEENT: Atraumatic, normocephalic, pupils reactive, negative for conjunctival 

pallor or scleral icterus, mucous membranes moist, throat clear, neck supple, 

nontender, trachea midline.


Lungs: Clear to auscultation, breath sounds equal bilaterally, chest nontender.


Heart: S1S2, regular, negative for clicks, rubs, or JVD.


Abdomen: Soft, nondistended, nontender. Negative for masses or 

hepatosplenomegaly. Negative for costovertebral tenderness.


Pelvis: Stable nontender.


Genitourinary: Deferred.


Rectal: Deferred.


Extremities: Atraumatic, negative for cords or calf pain. Neurovascular 

unremarkable.


Neuro: Awake, alert, oriented. Cranial nerves II through XII unremarkable. 

Cerebellum unremarkable. Motor and sensory unremarkable throughout. Exam 

nonfocal.


Diagnostics:


[Clinical


]


Therapeutics:


[Tetanus status is up-to-date


Keflex 500 by mouth twice a day #20 no refill


]


No sutures required


Standard wound care instruction


Bacitracin Telfa tube dressing





Impression: 


[ 0.5 cm linear laceration distal left index finger ]


Definitive disposition and diagnosis as appropriate pending reevaluation and 

review of above.


  ** Left 2-Index finger


Pain Score (Numeric/FACES): 4





- Related Data


 Allergies











Allergy/AdvReac Type Severity Reaction Status Date / Time


 


No Known Allergies Allergy   Verified 09/28/17 20:44











Home Meds: 


 Home Meds





Citalopram [Citalopram HBr] 10 mg PO DAILY 07/04/14 [History]


buPROPion HCl [Wellbutrin SR] 150 mg PO DAILY 07/04/14 [History]


Metoprolol Tartrate [Lopressor] 100 mg PO Q12HR #30 tablet 09/30/17 [Rx]


Lisinopril 10 mg PO DAILY 08/17/18 [History]


Rivaroxaban [Xarelto] 20 mg PO DAILY 08/17/18 [History]











Past Medical History





- Past Health History


Medical/Surgical History: Denies Medical/Surgical History


Cardiovascular History: Reports: None


Respiratory History: Reports: Sleep Apnea


Other Respiratory History: Uses CPAP at home


Gastrointestinal History: Reports: None


Genitourinary History: Reports: None


Musculoskeletal History: Reports: Fracture


Other Musculoskeletal History: Fractured collar bone in "seventh grade"


Neurological History: Reports: None


Psychiatric History: Reports: Anxiety, Depression


Endocrine/Metabolic History: Reports: None


Hematologic History: Reports: None


Immunologic History: Reports: None


Oncologic (Cancer) History: Reports: None


Dermatologic History: Reports: None





- Infectious Disease History


Infectious Disease History: Reports: Chicken Pox





- Past Surgical History


HEENT Surgical History: Reports: Other (See Below)


Other HEENT Surgeries/Procedures: facial reconstruction


Cardiovascular Surgical History: Reports: None


Respiratory Surgical History: Reports: None


Musculoskeletal Surgical History: Reports: None





Social & Family History





- Family History


Family Medical History: Noncontributory


Cardiac: Reports: MI


Other Cardiac Family History: Father had heart attack X 5 years ago





- Caffeine Use


Caffeine Use: Reports: Soda





ED ROS GENERAL





- Review of Systems


Review Of Systems: See Below





ED EXAM, SKIN/RASH


Exam: See Below





Course





- Vital Signs


Last Recorded V/S: 


 Last Vital Signs











Temp  97.2 F   08/17/18 15:21


 


Pulse  79   08/17/18 15:21


 


Resp  18   08/17/18 15:21


 


BP  147/91 H  08/17/18 15:21


 


Pulse Ox  97   08/17/18 15:21














Departure





- Departure


Time of Disposition: 15:49


Disposition: Home, Self-Care 01


Condition: Good


Clinical Impression: 


 Laceration








- Discharge Information


Referrals: 


PCP,None [Primary Care Provider] - 


Forms:  ED Department Discharge


Additional Instructions: 


The following information is given to patients seen in the emergency department 

who are being discharged to home. This information is to outline your options 

for follow-up care. We provide all patients seen in our emergency department 

with a follow-up referral.





The need for follow-up, as well as the timing and circumstances, are variable 

depending upon the specifics of your emergency department visit.





If you don't have a primary care physician on staff, we will provide you with a 

referral. We always advise you to contact your personal physician following an 

emergency department visit to inform them of the circumstance of the visit and 

for follow-up with them and/or the need for any referrals to a consulting 

specialist.





The emergency department will also refer you to a specialist when appropriate. 

This referral assures that you have the opportunity for follow-up care with a 

specialist. All of these measure are taken in an effort to provide you with 

optimal care, which includes your follow-up.





Under all circumstances we always encourage you to contact your private 

physician who remains a resource for coordinating your care. When calling for 

follow-up care, please make the office aware that this follow-up is from your 

recent emergency room visit. If for any reason you are refused follow-up, 

please contact the [facility] emergency department at [phone] 

and asked to speak to the emergency department charge nurse.